# Patient Record
Sex: FEMALE | URBAN - METROPOLITAN AREA
[De-identification: names, ages, dates, MRNs, and addresses within clinical notes are randomized per-mention and may not be internally consistent; named-entity substitution may affect disease eponyms.]

---

## 2023-02-22 ENCOUNTER — CLINICAL DOCUMENTATION (OUTPATIENT)
Facility: HOSPITAL | Age: 68
End: 2023-02-22

## 2023-03-05 NOTE — PROGRESS NOTES
Macie Love  Appointment: 2023 11:00 AM  [facility]  Patient #: [id number]  : 1955  Undefined / Language: Rod Marte / Race: White  Female      History of Present Illness Kaia Barahona MD; 3/5/2023 8:53 AM)  The patient is a 79year old female who presents with a complaint of Horseshoe Kidney . Note: Patient is a 69-year-old female who has been referred from PCPs office for evaluation of severe hypertension and [place] kidney    Past medical history:  #1 hypertension, diagnosed more than 10 years ago, symptomatic with headaches and occasional chest discomfort/heaviness. Had an episode of hypertensive urgency recently and had a ER visit. Presently taking Coreg, amlodipine and spironolactone and bring the scissors with it sits like it like a short stay with this and on top of at this mg daily. Blood pressure appears to be better controlled now. #2 horseshoe kidney. Diagnosed based on ultrasound, bilateral horseshoe kidneys with symmetrical dimensions. Renal duplex scan done did not show any evidence of renal artery stenosis, increased resistive indicis in both kidneys consistent with parenchymal disease. Patient does have family history of dysplastic kidney and normal  #3 history of hypercalcemia, PTH was 43 and calcium was 10.1 but has been as high as 11.4. PTH made to be inadequately suppressed, but not high enough to be the etiology for hypertension. #4 possible obstructive sleep apnea  #5 history of nonalcoholic steatohepatitis  #6 osteoarthritis and osteopenia    Patient has been referred for her severe hypertension and recent visit of hypertensive urgency. She has had secondary hypertension work-up with renal duplex scan negative for renal artery stenosis. She has had a potassium which has been in good range. Her creatinine number also is in good range at 0.6.   She has had plasma metanephrines checked which were negative aldosterone and renin were checked and both appear to be suppressed. Vitamin D was normal at 57. Protein creatinine ratio was negative. TSH was normal at 0.9    Symptom wise patient does have occasional headaches, chest pressure and palpitations. Her blood pressure recently has been well controlled. Blood pressure in the office was 122/78. 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 Heart Disease   Paternal Grandfather. High Cholesterol   Mother. Stroke   Mother, Brother. Seizure disorder   Father. Social History (Lucas Xavier; 2/21/2023 12:20 PM)  Tobacco use   Former smoker, Smokes cigars. cigaretts  does vaping flavoring  No Drug Use    Alcohol Use   Occasional alcohol use. Medication History (Kayla Mart; 2/22/2023 11:12 AM)  amLODIPine  (5mg tablet, 1 oral daily) Active. atorvastatin  (80mg tablet, 1 oral daily) Active. Voltaren Arthritis Pain  (1% gel, topical as needed) Active. Flonase Allergy Relief  (50mcg/actuat spray, suspensi, 2 sprays each nostril intranasal daily) Active. Lidocaine Viscous  (2% solution, oral, mucous membrane as needed) Active. metoprolol succinate  (100mg Tablet, Extende, 1 oral daily) Active. tiZANidine  (2mg capsule, 1 oral daily) Active.   Medications Reconciled     Health Maintenance History (Lucas Xavier; 2/22/2023 11:12 AM)  Pneumovax   [11/29/2018]:  Mammogram, Screening   [09/14/2022]: Normal.  Colonoscopy, Screening   [01/01/2019]:  Flu Vaccine   Refused. covid19 vaccine dates:   Refused. Review of Systems Dougie Gonzalez MD; 3/5/2023 8:53 AM)  General Not Present- Anorexia, Chills, Fatigue and Fever. Skin Not Present- Bruising, Pruritus, Rash and Ulcer. HEENT Not Present- Dry Mucous Membranes, Dysgeusia, Oral Ulcers, Periorbital Puffiness and Sore Throat. Respiratory Not Present- Cough, Difficulty Breathing on Exertion, Dyspnea and Hemoptysis. Cardiovascular Not Present- Chest Pain, Claudications, Orthopnea, Palpitations, Paroxysmal Nocturnal Dyspnea and Swelling of Extremities. Gastrointestinal Not Present- Abdominal Pain, Abdominal Swelling, Constipation, Diarrhea, Hematochezia, Melena, Nausea and Vomiting. Female Genitourinary Not Present- Blood in Urine, Difficulty Emptying Bladder, Dysuria, Frequency, Hematuria and Nocturia. Musculoskeletal Not Present- Joint Pain, Joint Redness, Joint Stiffness, Joint Swelling, Leg Cramps and Myalgia. Neurological Not Present- Dizziness, Headaches, Syncope and Trouble walking. Endocrine Not Present- Appetite Changes, Excessive Thirst, Polydipsia and Polyuria. Hematology Not Present- Easy Bruising and Excessive bleeding. Vitals Alis Matthew Barron; 2/22/2023 11:19 AM)  2/22/2023 11:15 AM  Weight: 138 lb   Height: 62 in   Body Surface Area: 1.63 m²   Body Mass Index: 25.24 kg/m²    Pain Level: 0/10    Temp.: 97° F    Pulse: 71 (Regular)    Resp. : 14 (Unlabored)    P. OX: 98% (Room air)  BP: 122/78(Sitting, Left Arm, Standard)              Physical Exam Dougie Gonzalez MD; 3/5/2023 8:53 AM)  Chest and Lung Exam  Auscultation  Breath sounds - Clear. Cardiovascular  Auscultation  Rhythm - Regular. Heart Sounds - Normal heart sounds.     Abdomen  Palpation/Percussion  Palpation and Percussion of the abdomen reveal - Soft, Non Tender and No hepatosplenomegaly. Auscultation  Auscultation of the abdomen reveals - Bowel sounds normal.    Peripheral Vascular  Upper Extremity  Palpation - Edema - Left - No edema - Left. Edema - Right - No edema - Right. Assessment & Plan Jus Sanchez MD; 3/5/2023 8:53 AM)    Horseshoe kidney (Q63.1)  Impression: Assessment and plan    #1 resistant hypertension, severe but better controlled. Etiology so far appears to be essential hypertension as secondary work-up negative so far. Patient has had evaluation for renal artery stenosis which is negative. No evidence of electrolyte abnormalities like hypokalemia or acid-base issues like metabolic alkalosis to indicate aldosterone excess. Renin and aldosterone both were suppressed. TSH was normal. Cortisol needs to be checked. Plasma metanephrines has been negative. Ultrasound does show evidence of parenchymal disease, likely effect of longstanding uncontrolled hypertension. Patient's blood pressure appears to be better controlled now on 3 agents which include a diuretic. Patient did not have hypercalcemia and can trigger hypertension but is better controlled recently and PTH is inadequately suppressed  #2 history of horseshoe kidney and family history of dysplastic kidney. Discussed with patient high risk for developing hyperfiltration injury and possibility of obstructive uropathy. We will continue to monitor renal functions and evidence of any proteinuria. Presently no further evaluation needed  #3 history of hypercalcemia.  Check ionized calcium with PTH    Plan:    #1 monitor blood pressures daily and log  #2 goal blood pressures of less than 130/80  #3 continue present blood pressure agents including spironolactone amlodipine and Coreg  #4 check serum cortisol  #5 check ionized calcium and PTH  #6 check urine analysis with microscopy to evaluate for hematuria  #7 low-salt diet, avoid caffeine  #8 maintain good hydration  #9 avoid NSAIDs counseled patient    Discussed plan with patient  Follow-up in 2 to 3 months time with labs ordered    Please cc with thanks to Dr. Ritika Peres     Signed by Stephany Cee MD (3/5/2023 8:54 AM)

## 2023-04-17 ENCOUNTER — HOSPITAL ENCOUNTER (OUTPATIENT)
Facility: HOSPITAL | Age: 68
Discharge: HOME | End: 2023-04-20
Payer: MEDICARE

## 2023-04-17 DIAGNOSIS — Q63.1 HORSESHOE KIDNEY: ICD-10-CM

## 2023-04-17 DIAGNOSIS — R73.03 PREDIABETES: ICD-10-CM

## 2023-04-17 LAB
ALBUMIN SERPL-MCNC: 4 G/DL (ref 3.4–5)
ANION GAP SERPL CALC-SCNC: 5 MMOL/L (ref 3–18)
BUN SERPL-MCNC: 16 MG/DL (ref 7–18)
BUN/CREAT SERPL: 23 (ref 12–20)
CA-I SERPL-SCNC: 1.2 MMOL/L (ref 1.15–1.33)
CALCIUM SERPL-MCNC: 10 MG/DL (ref 8.5–10.1)
CALCIUM SERPL-MCNC: 10.2 MG/DL (ref 8.5–10.1)
CHLORIDE SERPL-SCNC: 107 MMOL/L (ref 100–111)
CO2 SERPL-SCNC: 27 MMOL/L (ref 21–32)
CORTIS SERPL-MCNC: 7.8 UG/DL
CREAT SERPL-MCNC: 0.7 MG/DL (ref 0.6–1.3)
CREAT UR-MCNC: <13 MG/DL (ref 30–125)
CREAT UR-MCNC: <13 MG/DL (ref 30–125)
ERYTHROCYTE [DISTWIDTH] IN BLOOD BY AUTOMATED COUNT: 14.1 % (ref 11.6–14.5)
GLUCOSE SERPL-MCNC: 97 MG/DL (ref 74–99)
HCT VFR BLD AUTO: 40.6 % (ref 35–45)
HGB BLD-MCNC: 13.3 G/DL (ref 12–16)
MCH RBC QN AUTO: 29.6 PG (ref 24–34)
MCHC RBC AUTO-ENTMCNC: 32.8 G/DL (ref 31–37)
MCV RBC AUTO: 90.4 FL (ref 78–100)
MICROALBUMIN UR-MCNC: <0.5 MG/DL (ref 0–3)
MICROALBUMIN/CREAT UR-RTO: ABNORMAL MG/G (ref 0–30)
NRBC # BLD: 0 K/UL (ref 0–0.01)
NRBC BLD-RTO: 0 PER 100 WBC
PHOSPHATE SERPL-MCNC: 3.6 MG/DL (ref 2.5–4.9)
PLATELET # BLD AUTO: 410 K/UL (ref 135–420)
PMV BLD AUTO: 9.6 FL (ref 9.2–11.8)
POTASSIUM SERPL-SCNC: 3.6 MMOL/L (ref 3.5–5.5)
PROT UR-MCNC: 5 MG/DL
PTH-INTACT SERPL-MCNC: 58.4 PG/ML (ref 18.4–88)
RBC # BLD AUTO: 4.49 M/UL (ref 4.2–5.3)
SODIUM SERPL-SCNC: 139 MMOL/L (ref 136–145)
WBC # BLD AUTO: 6.9 K/UL (ref 4.6–13.2)

## 2023-04-17 PROCEDURE — 84156 ASSAY OF PROTEIN URINE: CPT

## 2023-04-17 PROCEDURE — 83970 ASSAY OF PARATHORMONE: CPT

## 2023-04-17 PROCEDURE — 82533 TOTAL CORTISOL: CPT

## 2023-04-17 PROCEDURE — 36415 COLL VENOUS BLD VENIPUNCTURE: CPT

## 2023-04-17 PROCEDURE — 82570 ASSAY OF URINE CREATININE: CPT

## 2023-04-17 PROCEDURE — 85027 COMPLETE CBC AUTOMATED: CPT

## 2023-04-17 PROCEDURE — 82043 UR ALBUMIN QUANTITATIVE: CPT

## 2023-04-17 PROCEDURE — 80069 RENAL FUNCTION PANEL: CPT

## 2023-04-17 PROCEDURE — 82330 ASSAY OF CALCIUM: CPT

## 2023-05-31 ENCOUNTER — HOSPITAL ENCOUNTER (OUTPATIENT)
Facility: HOSPITAL | Age: 68
Discharge: HOME | End: 2023-06-03
Payer: MEDICARE

## 2023-05-31 DIAGNOSIS — Q63.1 HORSESHOE KIDNEY: ICD-10-CM

## 2023-05-31 LAB — POTASSIUM SERPL-SCNC: 4.5 MMOL/L (ref 3.5–5.5)

## 2023-05-31 PROCEDURE — 84132 ASSAY OF SERUM POTASSIUM: CPT

## 2023-08-02 ENCOUNTER — OFFICE VISIT (OUTPATIENT)
Age: 68
End: 2023-08-02
Payer: MEDICARE

## 2023-08-02 DIAGNOSIS — R91.8 PULMONARY NODULES: Primary | ICD-10-CM

## 2023-08-02 DIAGNOSIS — Z87.891 EX-SMOKER: ICD-10-CM

## 2023-08-02 DIAGNOSIS — F12.90 MARIJUANA USE: ICD-10-CM

## 2023-08-02 DIAGNOSIS — Z78.9 ELECTRONIC CIGARETTE USE: ICD-10-CM

## 2023-08-02 PROCEDURE — G8427 DOCREV CUR MEDS BY ELIG CLIN: HCPCS | Performed by: INTERNAL MEDICINE

## 2023-08-02 PROCEDURE — 3017F COLORECTAL CA SCREEN DOC REV: CPT | Performed by: INTERNAL MEDICINE

## 2023-08-02 PROCEDURE — G8400 PT W/DXA NO RESULTS DOC: HCPCS | Performed by: INTERNAL MEDICINE

## 2023-08-02 PROCEDURE — G8420 CALC BMI NORM PARAMETERS: HCPCS | Performed by: INTERNAL MEDICINE

## 2023-08-02 PROCEDURE — 99203 OFFICE O/P NEW LOW 30 MIN: CPT | Performed by: INTERNAL MEDICINE

## 2023-08-02 PROCEDURE — 1090F PRES/ABSN URINE INCON ASSESS: CPT | Performed by: INTERNAL MEDICINE

## 2023-08-02 PROCEDURE — 4004F PT TOBACCO SCREEN RCVD TLK: CPT | Performed by: INTERNAL MEDICINE

## 2023-08-02 PROCEDURE — 1123F ACP DISCUSS/DSCN MKR DOCD: CPT | Performed by: INTERNAL MEDICINE

## 2023-08-02 RX ORDER — METOPROLOL SUCCINATE 100 MG/1
TABLET, EXTENDED RELEASE ORAL
COMMUNITY
Start: 2023-05-25 | End: 2024-05-22

## 2023-08-02 RX ORDER — ESCITALOPRAM OXALATE 10 MG/1
TABLET ORAL
COMMUNITY
Start: 2023-07-10

## 2023-08-02 RX ORDER — TIZANIDINE 2 MG/1
2 TABLET ORAL
COMMUNITY
Start: 2022-09-20 | End: 2023-09-20

## 2023-08-02 RX ORDER — OLOPATADINE HYDROCHLORIDE 1 MG/ML
SOLUTION/ DROPS OPHTHALMIC
COMMUNITY
Start: 2023-02-02 | End: 2024-02-02

## 2023-08-02 RX ORDER — LOSARTAN POTASSIUM 25 MG/1
TABLET ORAL
COMMUNITY
Start: 2023-05-03 | End: 2024-05-02

## 2023-08-02 RX ORDER — FLUTICASONE PROPIONATE 50 MCG
SPRAY, SUSPENSION (ML) NASAL
COMMUNITY
Start: 2022-11-09 | End: 2023-11-09

## 2023-08-02 RX ORDER — ATORVASTATIN CALCIUM 80 MG/1
1 TABLET, FILM COATED ORAL DAILY
Qty: 30 TABLET | Refills: 11 | COMMUNITY
Start: 2022-12-13 | End: 2023-12-13

## 2023-08-02 RX ORDER — AMLODIPINE BESYLATE 5 MG/1
5 TABLET ORAL 2 TIMES DAILY
COMMUNITY
Start: 2023-06-12

## 2023-08-02 RX ORDER — TOBRAMYCIN AND DEXAMETHASONE 3; 1 MG/ML; MG/ML
SUSPENSION/ DROPS OPHTHALMIC
COMMUNITY
Start: 2023-04-21 | End: 2024-05-08

## 2023-08-02 NOTE — PROGRESS NOTES
Beryle Malay presents today for   Chief Complaint   Patient presents with    New Patient     Referred by Dr. Bekah Hurtado for Lung Nodule       Is someone accompanying this pt? No    Is the patient using any DME equipment during OV? No    -DME Company NA    Depression Screening:    No flowsheet data found. Learning Needs Questionnaire:     Who is the primary learner? Patient    What is the preferred language for health care of the primary learner? ENGLISH    How does the primary learner prefer to learn new concepts? DEMONSTRATION    Answered By Patient    Relationship to Learner SELF          Fall Risk:     No flowsheet data found. Abuse Screening:     No flowsheet data found. Coordination of Care:    1. Have you been to the ER, urgent care clinic since your last visit? Hospitalized since your last visit? No    2. Have you seen or consulted any other health care providers outside of the [address] [place] [address] since your last visit? Include any pap smears or colon screening. PCP    Medication list has been update per patient.
amount. Transcribed Date and Time: 11/16/2022 13:36:00   Dictated and Signed by: MAGALY PAGE DO, [facility]  Pulmonary, Sleep and Critical Care Medicine

## 2023-08-02 NOTE — PATIENT INSTRUCTIONS
strongly encouraged. You should not drive if sleepy, tired, distracted and/or  fatigued. If a procedure or imaging study has been ordered for your by this clinic please call the [place] at [facility] or [place] at  [facility] [address] and blood work do not require appointments. They are considered \"Walk-In\" services and can be obtained at either [facility] or [facility]     Blood work is performed at the Laboratory (Lab) from 08:00am - 04:00pm      -Thank you

## 2023-08-03 VITALS
RESPIRATION RATE: 16 BRPM | OXYGEN SATURATION: 97 % | BODY MASS INDEX: 21.38 KG/M2 | HEIGHT: 67 IN | HEART RATE: 85 BPM | WEIGHT: 136.2 LBS | DIASTOLIC BLOOD PRESSURE: 72 MMHG | TEMPERATURE: 97.1 F | SYSTOLIC BLOOD PRESSURE: 148 MMHG

## 2023-08-10 ENCOUNTER — HOSPITAL ENCOUNTER (OUTPATIENT)
Facility: HOSPITAL | Age: 68
Discharge: HOME | End: 2023-08-10
Attending: INTERNAL MEDICINE
Payer: MEDICARE

## 2023-08-10 DIAGNOSIS — Z87.891 EX-SMOKER: ICD-10-CM

## 2023-08-10 DIAGNOSIS — F12.90 MARIJUANA USE: ICD-10-CM

## 2023-08-10 DIAGNOSIS — R91.8 PULMONARY NODULES: ICD-10-CM

## 2023-08-10 DIAGNOSIS — Z78.9 ELECTRONIC CIGARETTE USE: ICD-10-CM

## 2023-08-10 PROCEDURE — 71250 CT THORAX DX C-: CPT

## 2023-08-31 ENCOUNTER — OFFICE VISIT (OUTPATIENT)
Age: 68
End: 2023-08-31
Payer: MEDICARE

## 2023-08-31 VITALS
BODY MASS INDEX: 21.53 KG/M2 | DIASTOLIC BLOOD PRESSURE: 70 MMHG | TEMPERATURE: 97.4 F | OXYGEN SATURATION: 98 % | HEIGHT: 67 IN | WEIGHT: 137.2 LBS | HEART RATE: 68 BPM | RESPIRATION RATE: 16 BRPM | SYSTOLIC BLOOD PRESSURE: 130 MMHG

## 2023-08-31 DIAGNOSIS — Z87.891 PERSONAL HISTORY OF SMOKING: ICD-10-CM

## 2023-08-31 DIAGNOSIS — I10 PRIMARY HYPERTENSION: ICD-10-CM

## 2023-08-31 DIAGNOSIS — R91.8 LUNG NODULES: ICD-10-CM

## 2023-08-31 DIAGNOSIS — U07.0 E-CIGARETTE OR VAPING PRODUCT USE ASSOCIATED LUNG INJURY (EVALI): ICD-10-CM

## 2023-08-31 PROCEDURE — 3075F SYST BP GE 130 - 139MM HG: CPT | Performed by: INTERNAL MEDICINE

## 2023-08-31 PROCEDURE — G8400 PT W/DXA NO RESULTS DOC: HCPCS | Performed by: INTERNAL MEDICINE

## 2023-08-31 PROCEDURE — 1090F PRES/ABSN URINE INCON ASSESS: CPT | Performed by: INTERNAL MEDICINE

## 2023-08-31 PROCEDURE — 3078F DIAST BP <80 MM HG: CPT | Performed by: INTERNAL MEDICINE

## 2023-08-31 PROCEDURE — 1123F ACP DISCUSS/DSCN MKR DOCD: CPT | Performed by: INTERNAL MEDICINE

## 2023-08-31 PROCEDURE — G8428 CUR MEDS NOT DOCUMENT: HCPCS | Performed by: INTERNAL MEDICINE

## 2023-08-31 PROCEDURE — 3017F COLORECTAL CA SCREEN DOC REV: CPT | Performed by: INTERNAL MEDICINE

## 2023-08-31 PROCEDURE — 99214 OFFICE O/P EST MOD 30 MIN: CPT | Performed by: INTERNAL MEDICINE

## 2023-08-31 PROCEDURE — G8420 CALC BMI NORM PARAMETERS: HCPCS | Performed by: INTERNAL MEDICINE

## 2023-08-31 PROCEDURE — 1036F TOBACCO NON-USER: CPT | Performed by: INTERNAL MEDICINE

## 2023-08-31 NOTE — PROGRESS NOTES
Darius Bull presents today for   Chief Complaint   Patient presents with    Results     Chest CT        Is someone accompanying this pt? No    Is the patient using any DME equipment during OV? No    -DME Company NA    Depression Screening:    No flowsheet data found. Learning Needs Questionnaire:     No question data found. Fall Risk:     No flowsheet data found. Abuse Screening:     No flowsheet data found. Coordination of Care:    1. Have you been to the ER, urgent care clinic since your last visit? Hospitalized since your last visit? No    2. Have you seen or consulted any other health care providers outside of the [address] [place] [address] since your last visit? Include any pap smears or colon screening. No    Medication list has been update per patient.
of which measures 1.2 cm within the right upper lobe apical   segment (image 85, series 4). Unchanged size and appearance of previously   characterized subsolid nodules, the largest of which measures 1.1 cm within the   left upper lobe apical posterior segment (image 57, series 4). Few scattered   presumed identified solid pulmonary micronodules, unchanged. Few new peripheral   subpleural groundglass opacities associated with centrilobular micronodules   within the right lower lobe. Emphysema: Present   No pleural effusion. Abdomen:   No acute process identified. Similar sized small hypodense liver foci. Bones and Soft Tissues:   Unremarkable. IMPRESSION:   Few new right lower lobe peripheral subpleural groundglass opacities associated   with centrilobular micronodules, which may represent infectious bronchiolitis. Pulmonary nodules previously characterized on CT from May 2022 are not   significantly changed. Lung-RADS Category: [3] - Probably Benign (Probably benign finding(s) - short   term follow up suggested; includes nodules with a low likelihood of becoming a   clinically active cancer). Management: 6 month follow-up low dose chest CT.   Lung-RADS S (other significant findings): No   Lung-RADS C (history of lung cancer): No   Coronary artery atherosclerotic calcification: Moderate amount. Transcribed Date and Time: 11/16/2022 13:36:00   Dictated and Signed by: DR. Sonali Soler MD  08/31/23  Pulmonary, Critical Care Medicine  [facility]

## 2023-09-24 SDOH — HEALTH STABILITY: PHYSICAL HEALTH: ON AVERAGE, HOW MANY MINUTES DO YOU ENGAGE IN EXERCISE AT THIS LEVEL?: 20 MIN

## 2023-09-24 SDOH — HEALTH STABILITY: PHYSICAL HEALTH: ON AVERAGE, HOW MANY DAYS PER WEEK DO YOU ENGAGE IN MODERATE TO STRENUOUS EXERCISE (LIKE A BRISK WALK)?: 5 DAYS

## 2023-09-27 ENCOUNTER — OFFICE VISIT (OUTPATIENT)
Age: 68
End: 2023-09-27

## 2023-09-27 VITALS — WEIGHT: 137 LBS | TEMPERATURE: 97.3 F | HEIGHT: 67 IN | BODY MASS INDEX: 21.5 KG/M2

## 2023-09-27 DIAGNOSIS — M25.519 SHOULDER PAIN, UNSPECIFIED CHRONICITY, UNSPECIFIED LATERALITY: Primary | ICD-10-CM

## 2023-09-27 RX ORDER — MELOXICAM 15 MG/1
15 TABLET ORAL DAILY
Qty: 30 TABLET | Refills: 3 | Status: SHIPPED | OUTPATIENT
Start: 2023-09-27

## 2023-09-27 RX ORDER — LOTEPREDNOL ETABONATE 2 MG/ML
SUSPENSION/ DROPS OPHTHALMIC
COMMUNITY
Start: 2023-09-13 | End: 2024-08-24

## 2023-09-27 RX ORDER — AMOXICILLIN 250 MG
CAPSULE ORAL
COMMUNITY

## 2023-09-27 RX ORDER — ASPIRIN 81 MG
1 TABLET, DELAYED RELEASE (ENTERIC COATED) ORAL
COMMUNITY

## 2023-09-27 RX ORDER — IBUPROFEN 800 MG/1
TABLET ORAL
COMMUNITY
Start: 2023-08-08

## 2023-09-27 RX ORDER — TELMISARTAN 80 MG/1
1 TABLET ORAL
COMMUNITY

## 2023-09-27 RX ORDER — HYDROCHLOROTHIAZIDE 12.5 MG/1
1 CAPSULE, GELATIN COATED ORAL
COMMUNITY
Start: 2016-07-21

## 2023-09-27 RX ORDER — MELOXICAM 15 MG/1
TABLET ORAL
COMMUNITY

## 2023-09-27 NOTE — PROGRESS NOTES
Itz Pratt  1955   Chief Complaint   Patient presents with    Shoulder Pain     right        HISTORY OF PRESENT ILLNESS  Itz Pratt is a 79 y.o. female who presents today for evaluation of right shoulder pain. Pain is a 6/10. Pain has been present for longstanding character but worse in the last few weeks. Patient has been having problems with the shoulder for a long time and recently started doing more exercises and felt a sudden snap in the shoulder associated with swelling and bruising. Having increasing difficulty moving her right arm is associated with pain at nighttime    Has tried following treatments: Injections:No; Brace:No; Therapy:No; Cane/Crutch:No      Allergies   Allergen Reactions    Bupropion     Losartan         Past Medical History:   Diagnosis Date    Arthritis     Hypertension     Sleep apnea       Social History       Tobacco History       Smoking Status  Former Quit Date  5/1/2019 Smoking Frequency  1 pack/day for 50.00 years (50.00 ttl pk-yrs) Smoking Tobacco Type  Cigarettes quit in 5/1/2019      Smokeless Tobacco Use  Never      Tobacco Comments  Smoked cigerettes and cigars in past. Now vaping              Alcohol History       Alcohol Use Status  Yes Drinks/Week  0 Glasses of wine, 6 Cans of beer, 6 Shots of liquor, 0 Drinks containing 0.5 oz of alcohol per week Amount  12.0 standard drinks of alcohol/wk              Drug Use       Drug Use Status  Not Asked              Sexual Activity       Sexually Active  Not Currently                   History reviewed. No pertinent surgical history.    Family History   Problem Relation Age of Onset    Heart Disease Mother     Hypertension Mother     Stroke Mother     Hypertension Father     Heart Disease Maternal Grandfather     Stroke Brother      Current Outpatient Medications   Medication Sig    Docusate Sodium 100 MG TABS 1 tablet    hydroCHLOROthiazide (MICROZIDE) 12.5 MG capsule 1 capsule    ibuprofen (ADVIL;MOTRIN)

## 2023-10-25 ENCOUNTER — OFFICE VISIT (OUTPATIENT)
Age: 68
End: 2023-10-25
Payer: MEDICARE

## 2023-10-25 VITALS — BODY MASS INDEX: 21.5 KG/M2 | HEIGHT: 67 IN | WEIGHT: 137 LBS

## 2023-10-25 DIAGNOSIS — M75.101 ROTATOR CUFF TEAR ARTHROPATHY, RIGHT: Primary | ICD-10-CM

## 2023-10-25 DIAGNOSIS — M12.811 ROTATOR CUFF TEAR ARTHROPATHY, RIGHT: Primary | ICD-10-CM

## 2023-10-25 DIAGNOSIS — S46.211D BICEPS TENDON RUPTURE, RIGHT, SUBSEQUENT ENCOUNTER: ICD-10-CM

## 2023-10-25 PROCEDURE — 99212 OFFICE O/P EST SF 10 MIN: CPT | Performed by: ORTHOPAEDIC SURGERY

## 2023-10-25 PROCEDURE — G8484 FLU IMMUNIZE NO ADMIN: HCPCS | Performed by: ORTHOPAEDIC SURGERY

## 2023-10-25 PROCEDURE — G8420 CALC BMI NORM PARAMETERS: HCPCS | Performed by: ORTHOPAEDIC SURGERY

## 2023-10-25 PROCEDURE — 1090F PRES/ABSN URINE INCON ASSESS: CPT | Performed by: ORTHOPAEDIC SURGERY

## 2023-10-25 PROCEDURE — 1036F TOBACCO NON-USER: CPT | Performed by: ORTHOPAEDIC SURGERY

## 2023-10-25 PROCEDURE — G8400 PT W/DXA NO RESULTS DOC: HCPCS | Performed by: ORTHOPAEDIC SURGERY

## 2023-10-25 PROCEDURE — 3017F COLORECTAL CA SCREEN DOC REV: CPT | Performed by: ORTHOPAEDIC SURGERY

## 2023-10-25 PROCEDURE — 1123F ACP DISCUSS/DSCN MKR DOCD: CPT | Performed by: ORTHOPAEDIC SURGERY

## 2023-10-25 PROCEDURE — G8427 DOCREV CUR MEDS BY ELIG CLIN: HCPCS | Performed by: ORTHOPAEDIC SURGERY

## 2023-12-06 ENCOUNTER — HOSPITAL ENCOUNTER (OUTPATIENT)
Facility: HOSPITAL | Age: 68
Discharge: HOME | End: 2023-12-09
Attending: INTERNAL MEDICINE
Payer: MEDICARE

## 2023-12-06 DIAGNOSIS — R91.8 LUNG NODULES: ICD-10-CM

## 2023-12-06 PROCEDURE — 71250 CT THORAX DX C-: CPT
